# Patient Record
Sex: FEMALE | URBAN - METROPOLITAN AREA
[De-identification: names, ages, dates, MRNs, and addresses within clinical notes are randomized per-mention and may not be internally consistent; named-entity substitution may affect disease eponyms.]

---

## 2020-07-25 ENCOUNTER — APPOINTMENT (RX ONLY)
Dept: URBAN - METROPOLITAN AREA CLINIC 4 | Facility: CLINIC | Age: 35
Setting detail: DERMATOLOGY
End: 2020-07-25

## 2020-07-25 DIAGNOSIS — D22 MELANOCYTIC NEVI: ICD-10-CM

## 2020-07-25 DIAGNOSIS — L81.0 POSTINFLAMMATORY HYPERPIGMENTATION: ICD-10-CM

## 2020-07-25 PROBLEM — D22.112 MELANOCYTIC NEVI OF RIGHT LOWER EYELID, INCLUDING CANTHUS: Status: ACTIVE | Noted: 2020-07-25

## 2020-07-25 PROBLEM — D23.39 OTHER BENIGN NEOPLASM OF SKIN OF OTHER PARTS OF FACE: Status: ACTIVE | Noted: 2020-07-25

## 2020-07-25 PROCEDURE — ? OBSERVATION

## 2020-07-25 PROCEDURE — ? COUNSELING

## 2020-07-25 PROCEDURE — 99203 OFFICE O/P NEW LOW 30 MIN: CPT

## 2020-07-25 ASSESSMENT — LOCATION DETAILED DESCRIPTION DERM
LOCATION DETAILED: RIGHT ANTERIOR MEDIAL PROXIMAL THIGH
LOCATION DETAILED: RIGHT LATERAL INFERIOR PRETARSAL REGION

## 2020-07-25 ASSESSMENT — LOCATION ZONE DERM
LOCATION ZONE: EYELID
LOCATION ZONE: LEG

## 2020-07-25 ASSESSMENT — LOCATION SIMPLE DESCRIPTION DERM
LOCATION SIMPLE: RIGHT LATERAL INFERIOR PRETARSAL REGION
LOCATION SIMPLE: RIGHT THIGH

## 2021-07-29 ENCOUNTER — HOSPITAL ENCOUNTER (INPATIENT)
Dept: HOSPITAL 8 - LDOP | Age: 36
LOS: 3 days | Discharge: HOME | End: 2021-08-01
Attending: OBSTETRICS & GYNECOLOGY | Admitting: OBSTETRICS & GYNECOLOGY
Payer: COMMERCIAL

## 2021-07-29 VITALS — HEIGHT: 66 IN | BODY MASS INDEX: 31.39 KG/M2 | WEIGHT: 195.33 LBS

## 2021-07-29 DIAGNOSIS — Z3A.39: ICD-10-CM

## 2021-07-29 DIAGNOSIS — O09.523: ICD-10-CM

## 2021-07-29 DIAGNOSIS — Z20.822: ICD-10-CM

## 2021-07-29 DIAGNOSIS — O32.2XX0: Primary | ICD-10-CM

## 2021-07-29 LAB
BASOPHILS # BLD AUTO: 0.1 X10^3/UL (ref 0–0.1)
BASOPHILS NFR BLD AUTO: 1 % (ref 0–1)
EOSINOPHIL # BLD AUTO: 0.2 X10^3/UL (ref 0–0.4)
EOSINOPHIL NFR BLD AUTO: 1 % (ref 1–7)
ERYTHROCYTE [DISTWIDTH] IN BLOOD BY AUTOMATED COUNT: 13.4 % (ref 9.6–15.2)
LYMPHOCYTES # BLD AUTO: 2.7 X10^3/UL (ref 1–3.4)
LYMPHOCYTES NFR BLD AUTO: 21 % (ref 22–44)
MCH RBC QN AUTO: 29.9 PG (ref 27–34.8)
MCHC RBC AUTO-ENTMCNC: 34.2 G/DL (ref 32.4–35.8)
MONOCYTES # BLD AUTO: 1.1 X10^3/UL (ref 0.2–0.8)
MONOCYTES NFR BLD AUTO: 8 % (ref 2–9)
NEUTROPHILS # BLD AUTO: 9.3 X10^3/UL (ref 1.8–6.8)
NEUTROPHILS NFR BLD AUTO: 70 % (ref 42–75)
PLATELET # BLD AUTO: 264 X10^3/UL (ref 130–400)
PMV BLD AUTO: 7.5 FL (ref 7.4–10.4)
RBC # BLD AUTO: 4.8 X10^6/UL (ref 3.82–5.3)

## 2021-07-29 PROCEDURE — 36415 COLL VENOUS BLD VENIPUNCTURE: CPT

## 2021-07-29 PROCEDURE — 86592 SYPHILIS TEST NON-TREP QUAL: CPT

## 2021-07-29 PROCEDURE — 87635 SARS-COV-2 COVID-19 AMP PRB: CPT

## 2021-07-29 PROCEDURE — 85025 COMPLETE CBC W/AUTO DIFF WBC: CPT

## 2021-07-29 PROCEDURE — 86850 RBC ANTIBODY SCREEN: CPT

## 2021-07-29 PROCEDURE — 86900 BLOOD TYPING SEROLOGIC ABO: CPT

## 2021-07-30 VITALS — DIASTOLIC BLOOD PRESSURE: 57 MMHG | SYSTOLIC BLOOD PRESSURE: 89 MMHG

## 2021-07-30 VITALS — SYSTOLIC BLOOD PRESSURE: 102 MMHG | DIASTOLIC BLOOD PRESSURE: 62 MMHG

## 2021-07-30 VITALS — DIASTOLIC BLOOD PRESSURE: 57 MMHG | SYSTOLIC BLOOD PRESSURE: 96 MMHG

## 2021-07-30 VITALS — DIASTOLIC BLOOD PRESSURE: 62 MMHG | SYSTOLIC BLOOD PRESSURE: 95 MMHG

## 2021-07-30 VITALS — SYSTOLIC BLOOD PRESSURE: 99 MMHG | DIASTOLIC BLOOD PRESSURE: 64 MMHG

## 2021-07-30 VITALS — DIASTOLIC BLOOD PRESSURE: 59 MMHG | SYSTOLIC BLOOD PRESSURE: 95 MMHG

## 2021-07-30 LAB
BASOPHILS # BLD AUTO: 0 X10^3/UL (ref 0–0.1)
BASOPHILS NFR BLD AUTO: 0 % (ref 0–1)
EOSINOPHIL # BLD AUTO: 0 X10^3/UL (ref 0–0.4)
EOSINOPHIL NFR BLD AUTO: 0 % (ref 1–7)
ERYTHROCYTE [DISTWIDTH] IN BLOOD BY AUTOMATED COUNT: 13.3 % (ref 9.6–15.2)
LYMPHOCYTES # BLD AUTO: 1.2 X10^3/UL (ref 1–3.4)
LYMPHOCYTES NFR BLD AUTO: 7 % (ref 22–44)
MCH RBC QN AUTO: 30 PG (ref 27–34.8)
MCHC RBC AUTO-ENTMCNC: 34.3 G/DL (ref 32.4–35.8)
MONOCYTES # BLD AUTO: 0.8 X10^3/UL (ref 0.2–0.8)
MONOCYTES NFR BLD AUTO: 4 % (ref 2–9)
NEUTROPHILS # BLD AUTO: 15.9 X10^3/UL (ref 1.8–6.8)
NEUTROPHILS NFR BLD AUTO: 89 % (ref 42–75)
PLATELET # BLD AUTO: 227 X10^3/UL (ref 130–400)
PMV BLD AUTO: 7.3 FL (ref 7.4–10.4)
RBC # BLD AUTO: 4.21 X10^6/UL (ref 3.82–5.3)

## 2021-07-30 RX ADMIN — SODIUM CHLORIDE, SODIUM LACTATE, POTASSIUM CHLORIDE, AND CALCIUM CHLORIDE SCH MLS/HR: .6; .31; .03; .02 INJECTION, SOLUTION INTRAVENOUS at 02:00

## 2021-07-30 RX ADMIN — SODIUM CHLORIDE, SODIUM LACTATE, POTASSIUM CHLORIDE, AND CALCIUM CHLORIDE SCH MLS/HR: .6; .31; .03; .02 INJECTION, SOLUTION INTRAVENOUS at 18:00

## 2021-07-30 RX ADMIN — SODIUM CHLORIDE, SODIUM LACTATE, POTASSIUM CHLORIDE, AND CALCIUM CHLORIDE SCH MLS/HR: .6; .31; .03; .02 INJECTION, SOLUTION INTRAVENOUS at 12:00

## 2021-07-30 RX ADMIN — SODIUM CHLORIDE, SODIUM LACTATE, POTASSIUM CHLORIDE, AND CALCIUM CHLORIDE SCH MLS/HR: .6; .31; .03; .02 INJECTION, SOLUTION INTRAVENOUS at 22:00

## 2021-07-30 RX ADMIN — SIMETHICONE PRN MG: 80 TABLET, CHEWABLE ORAL at 09:21

## 2021-07-30 RX ADMIN — DOCUSATE SODIUM PRN MG: 100 CAPSULE, LIQUID FILLED ORAL at 20:59

## 2021-07-30 RX ADMIN — Medication SCH MLS/HR: at 02:00

## 2021-07-30 RX ADMIN — SODIUM CHLORIDE, SODIUM LACTATE, POTASSIUM CHLORIDE, AND CALCIUM CHLORIDE SCH MLS/HR: .6; .31; .03; .02 INJECTION, SOLUTION INTRAVENOUS at 10:00

## 2021-07-30 RX ADMIN — IBUPROFEN PRN MG: 800 TABLET ORAL at 17:03

## 2021-07-30 RX ADMIN — OXYCODONE HYDROCHLORIDE PRN MG: 5 TABLET ORAL at 21:00

## 2021-07-30 RX ADMIN — Medication SCH MLS/HR: at 22:00

## 2021-07-30 RX ADMIN — DOCUSATE SODIUM PRN MG: 100 CAPSULE, LIQUID FILLED ORAL at 09:21

## 2021-07-30 RX ADMIN — Medication SCH MLS/HR: at 12:00

## 2021-07-30 RX ADMIN — IBUPROFEN PRN MG: 800 TABLET ORAL at 09:21

## 2021-07-30 RX ADMIN — PRENATAL VIT W/ FE FUMARATE-FA TAB 27-0.8 MG SCH EACH: 27-0.8 TAB at 09:21

## 2021-07-31 VITALS — DIASTOLIC BLOOD PRESSURE: 64 MMHG | SYSTOLIC BLOOD PRESSURE: 97 MMHG

## 2021-07-31 VITALS — SYSTOLIC BLOOD PRESSURE: 100 MMHG | DIASTOLIC BLOOD PRESSURE: 61 MMHG

## 2021-07-31 VITALS — SYSTOLIC BLOOD PRESSURE: 99 MMHG | DIASTOLIC BLOOD PRESSURE: 58 MMHG

## 2021-07-31 VITALS — SYSTOLIC BLOOD PRESSURE: 94 MMHG | DIASTOLIC BLOOD PRESSURE: 61 MMHG

## 2021-07-31 RX ADMIN — IBUPROFEN PRN MG: 800 TABLET ORAL at 16:49

## 2021-07-31 RX ADMIN — OXYCODONE HYDROCHLORIDE PRN MG: 5 TABLET ORAL at 00:57

## 2021-07-31 RX ADMIN — SIMETHICONE PRN MG: 80 TABLET, CHEWABLE ORAL at 23:02

## 2021-07-31 RX ADMIN — PRENATAL VIT W/ FE FUMARATE-FA TAB 27-0.8 MG SCH EACH: 27-0.8 TAB at 08:41

## 2021-07-31 RX ADMIN — OXYCODONE HYDROCHLORIDE PRN MG: 5 TABLET ORAL at 23:02

## 2021-07-31 RX ADMIN — DOCUSATE SODIUM PRN MG: 100 CAPSULE, LIQUID FILLED ORAL at 08:41

## 2021-07-31 RX ADMIN — OXYCODONE HYDROCHLORIDE PRN MG: 5 TABLET ORAL at 16:51

## 2021-07-31 RX ADMIN — IBUPROFEN PRN MG: 800 TABLET ORAL at 08:41

## 2021-07-31 RX ADMIN — DOCUSATE SODIUM PRN MG: 100 CAPSULE, LIQUID FILLED ORAL at 23:02

## 2021-07-31 RX ADMIN — SODIUM CHLORIDE, SODIUM LACTATE, POTASSIUM CHLORIDE, AND CALCIUM CHLORIDE SCH MLS/HR: .6; .31; .03; .02 INJECTION, SOLUTION INTRAVENOUS at 02:00

## 2021-07-31 RX ADMIN — OXYCODONE HYDROCHLORIDE PRN MG: 5 TABLET ORAL at 08:41

## 2021-07-31 RX ADMIN — IBUPROFEN PRN MG: 800 TABLET ORAL at 00:57

## 2021-08-01 VITALS — DIASTOLIC BLOOD PRESSURE: 62 MMHG | SYSTOLIC BLOOD PRESSURE: 95 MMHG

## 2021-08-01 RX ADMIN — PRENATAL VIT W/ FE FUMARATE-FA TAB 27-0.8 MG SCH EACH: 27-0.8 TAB at 09:44

## 2021-08-01 RX ADMIN — OXYCODONE HYDROCHLORIDE PRN MG: 5 TABLET ORAL at 13:40

## 2021-08-01 RX ADMIN — IBUPROFEN PRN MG: 800 TABLET ORAL at 13:40

## 2021-08-01 RX ADMIN — IBUPROFEN PRN MG: 800 TABLET ORAL at 04:05

## 2023-05-01 ENCOUNTER — OFFICE VISIT (OUTPATIENT)
Dept: INTERNAL MEDICINE | Facility: OTHER | Age: 38
End: 2023-05-01
Payer: COMMERCIAL

## 2023-05-01 VITALS
TEMPERATURE: 98.3 F | WEIGHT: 155.4 LBS | HEART RATE: 77 BPM | SYSTOLIC BLOOD PRESSURE: 94 MMHG | BODY MASS INDEX: 24.98 KG/M2 | DIASTOLIC BLOOD PRESSURE: 62 MMHG | OXYGEN SATURATION: 98 % | HEIGHT: 66 IN

## 2023-05-01 DIAGNOSIS — Z11.59 NEED FOR HEPATITIS C SCREENING TEST: ICD-10-CM

## 2023-05-01 DIAGNOSIS — Z13.228 SCREENING FOR METABOLIC DISORDER: ICD-10-CM

## 2023-05-01 DIAGNOSIS — Z30.09 GENERAL COUNSELING AND ADVICE ON CONTRACEPTIVE MANAGEMENT: ICD-10-CM

## 2023-05-01 DIAGNOSIS — L98.9 SCALP LESION: ICD-10-CM

## 2023-05-01 PROCEDURE — 99204 OFFICE O/P NEW MOD 45 MIN: CPT | Mod: GC | Performed by: INTERNAL MEDICINE

## 2023-05-01 SDOH — HEALTH STABILITY: PHYSICAL HEALTH: ON AVERAGE, HOW MANY DAYS PER WEEK DO YOU ENGAGE IN MODERATE TO STRENUOUS EXERCISE (LIKE A BRISK WALK)?: 3 DAYS

## 2023-05-01 SDOH — ECONOMIC STABILITY: FOOD INSECURITY: WITHIN THE PAST 12 MONTHS, YOU WORRIED THAT YOUR FOOD WOULD RUN OUT BEFORE YOU GOT MONEY TO BUY MORE.: NEVER TRUE

## 2023-05-01 SDOH — ECONOMIC STABILITY: TRANSPORTATION INSECURITY
IN THE PAST 12 MONTHS, HAS LACK OF RELIABLE TRANSPORTATION KEPT YOU FROM MEDICAL APPOINTMENTS, MEETINGS, WORK OR FROM GETTING THINGS NEEDED FOR DAILY LIVING?: NO

## 2023-05-01 SDOH — ECONOMIC STABILITY: TRANSPORTATION INSECURITY
IN THE PAST 12 MONTHS, HAS THE LACK OF TRANSPORTATION KEPT YOU FROM MEDICAL APPOINTMENTS OR FROM GETTING MEDICATIONS?: NO

## 2023-05-01 SDOH — HEALTH STABILITY: MENTAL HEALTH
STRESS IS WHEN SOMEONE FEELS TENSE, NERVOUS, ANXIOUS, OR CAN'T SLEEP AT NIGHT BECAUSE THEIR MIND IS TROUBLED. HOW STRESSED ARE YOU?: TO SOME EXTENT

## 2023-05-01 SDOH — ECONOMIC STABILITY: TRANSPORTATION INSECURITY
IN THE PAST 12 MONTHS, HAS LACK OF TRANSPORTATION KEPT YOU FROM MEETINGS, WORK, OR FROM GETTING THINGS NEEDED FOR DAILY LIVING?: NO

## 2023-05-01 SDOH — HEALTH STABILITY: PHYSICAL HEALTH: ON AVERAGE, HOW MANY MINUTES DO YOU ENGAGE IN EXERCISE AT THIS LEVEL?: 50 MIN

## 2023-05-01 SDOH — ECONOMIC STABILITY: HOUSING INSECURITY
IN THE LAST 12 MONTHS, WAS THERE A TIME WHEN YOU DID NOT HAVE A STEADY PLACE TO SLEEP OR SLEPT IN A SHELTER (INCLUDING NOW)?: NO

## 2023-05-01 SDOH — ECONOMIC STABILITY: FOOD INSECURITY: WITHIN THE PAST 12 MONTHS, THE FOOD YOU BOUGHT JUST DIDN'T LAST AND YOU DIDN'T HAVE MONEY TO GET MORE.: NEVER TRUE

## 2023-05-01 SDOH — ECONOMIC STABILITY: INCOME INSECURITY: IN THE LAST 12 MONTHS, WAS THERE A TIME WHEN YOU WERE NOT ABLE TO PAY THE MORTGAGE OR RENT ON TIME?: NO

## 2023-05-01 SDOH — ECONOMIC STABILITY: HOUSING INSECURITY

## 2023-05-01 SDOH — ECONOMIC STABILITY: INCOME INSECURITY: HOW HARD IS IT FOR YOU TO PAY FOR THE VERY BASICS LIKE FOOD, HOUSING, MEDICAL CARE, AND HEATING?: NOT VERY HARD

## 2023-05-01 ASSESSMENT — SOCIAL DETERMINANTS OF HEALTH (SDOH)
DO YOU BELONG TO ANY CLUBS OR ORGANIZATIONS SUCH AS CHURCH GROUPS UNIONS, FRATERNAL OR ATHLETIC GROUPS, OR SCHOOL GROUPS?: NO
HOW OFTEN DO YOU ATTENT MEETINGS OF THE CLUB OR ORGANIZATION YOU BELONG TO?: NEVER
HOW OFTEN DO YOU HAVE A DRINK CONTAINING ALCOHOL: NEVER
HOW OFTEN DO YOU GET TOGETHER WITH FRIENDS OR RELATIVES?: ONCE A WEEK
HOW OFTEN DO YOU HAVE SIX OR MORE DRINKS ON ONE OCCASION: NEVER
IN A TYPICAL WEEK, HOW MANY TIMES DO YOU TALK ON THE PHONE WITH FAMILY, FRIENDS, OR NEIGHBORS?: ONCE A WEEK
WITHIN THE PAST 12 MONTHS, YOU WORRIED THAT YOUR FOOD WOULD RUN OUT BEFORE YOU GOT THE MONEY TO BUY MORE: NEVER TRUE
HOW OFTEN DO YOU ATTENT MEETINGS OF THE CLUB OR ORGANIZATION YOU BELONG TO?: NEVER
DO YOU BELONG TO ANY CLUBS OR ORGANIZATIONS SUCH AS CHURCH GROUPS UNIONS, FRATERNAL OR ATHLETIC GROUPS, OR SCHOOL GROUPS?: NO
HOW OFTEN DO YOU GET TOGETHER WITH FRIENDS OR RELATIVES?: ONCE A WEEK
HOW MANY DRINKS CONTAINING ALCOHOL DO YOU HAVE ON A TYPICAL DAY WHEN YOU ARE DRINKING: PATIENT DOES NOT DRINK
HOW OFTEN DO YOU ATTEND CHURCH OR RELIGIOUS SERVICES?: NEVER
IN A TYPICAL WEEK, HOW MANY TIMES DO YOU TALK ON THE PHONE WITH FAMILY, FRIENDS, OR NEIGHBORS?: ONCE A WEEK
HOW OFTEN DO YOU ATTEND CHURCH OR RELIGIOUS SERVICES?: NEVER
HOW HARD IS IT FOR YOU TO PAY FOR THE VERY BASICS LIKE FOOD, HOUSING, MEDICAL CARE, AND HEATING?: NOT VERY HARD

## 2023-05-01 ASSESSMENT — ENCOUNTER SYMPTOMS
ABDOMINAL PAIN: 0
COUGH: 0
CONSTIPATION: 0
NERVOUS/ANXIOUS: 1
DIARRHEA: 0
BLURRED VISION: 0
HEADACHES: 0
CHILLS: 0
SHORTNESS OF BREATH: 0
FEVER: 0
DIZZINESS: 0
WEIGHT LOSS: 0
WEAKNESS: 0
PALPITATIONS: 0
DIAPHORESIS: 0
DEPRESSION: 0
NAUSEA: 0
VOMITING: 0
WHEEZING: 0
HEARTBURN: 0
BLOOD IN STOOL: 0
DOUBLE VISION: 0

## 2023-05-01 ASSESSMENT — LIFESTYLE VARIABLES
SKIP TO QUESTIONS 9-10: 1
HOW OFTEN DO YOU HAVE SIX OR MORE DRINKS ON ONE OCCASION: NEVER
AUDIT-C TOTAL SCORE: 0
SUBSTANCE_ABUSE: 0
HOW OFTEN DO YOU HAVE A DRINK CONTAINING ALCOHOL: NEVER
HOW MANY STANDARD DRINKS CONTAINING ALCOHOL DO YOU HAVE ON A TYPICAL DAY: PATIENT DOES NOT DRINK

## 2023-05-01 ASSESSMENT — PATIENT HEALTH QUESTIONNAIRE - PHQ9: CLINICAL INTERPRETATION OF PHQ2 SCORE: 0

## 2023-05-01 NOTE — ASSESSMENT & PLAN NOTE
Patient would not like to have more children. Currently sexually active with  and using condoms. We discussed pros and cons of various methods as well as what we have to offer at our clinic and what we cannot provide (such as IUDs, implants, tubal ligation).     Plan:  -Provided handout on various birth control methods  -Patient will take time to get more education and reach out for prescription/orders or referral to gynecology  -Will continue to use condoms in the interim

## 2023-05-01 NOTE — ASSESSMENT & PLAN NOTE
1x1x0.5 cm hard, nonmobile, nontender round bump on top of scalp (midline) with no visible skin changes, no discharge. First noticed 2017, has not grown or changed. Likely sebacious cyst or lipoma, benign in nature.     Plan:  -Patient will monitor for signs of growth or symptomatic changes  -No imaging needed at this time

## 2023-05-01 NOTE — ASSESSMENT & PLAN NOTE
Patient has not yet been screened for hep C as recommended by USPSTF for all adults.     Plan:  -Hep C screen ordered

## 2023-05-01 NOTE — ASSESSMENT & PLAN NOTE
Patient has significant family history of diabetes and has no recent labs on file screening for metabolic disorder. Given that she is 37 YO she is due for screening.     Plan:  -BMP ordered to evaluate for renal disease, impaired fasting glucose  -lipid panel ordered to evaluate for hyper/dyslipidemia

## 2023-05-01 NOTE — PROGRESS NOTES
"Deirdre Banda is a 38 y.o. female who presents today with the following:    CC: Establish Care with Primary Care Physician and address bump on her head    HPI:  Deirdre Banda is a 37 YO F with no significant contributory PMHx who presents to clinic today to establish care and address a bump on her head that she first noticed in 2017. Back then she was told it was likely a fat collection and there were no concerns. The bump has not changed in size or caused any symptoms other than her feeling maybe there is some link to when she used to get headaches and it would feel like increased pressure in the area; that sensation has resolved. There is no pain associated with the bump and there is no discharge from the lesion.    She did experience a couple days of diarrhea and stomach upset a week ago which she attributes to kids being sick at  and bringing home a bug; symptoms have resolved.     Her last pap smear was in September 2021 and she has had no history of abnormal pap smears or need for biopsies.     She is requesting \"routine\" labs since she has not been seeing a PCP and wants to make sure there is nothing wrong with her labs. Last CBC in 2021 relatively nonconcerning but no recent BMP or lipid panel on file and does have family history of diabetes.     She is using condoms with her  now and is not interested in having more children. She would like to learn more about contraceptive methods.       Review of Systems   Constitutional:  Negative for chills, diaphoresis, fever and weight loss.   HENT:  Negative for hearing loss.    Eyes:  Negative for blurred vision and double vision.   Respiratory:  Negative for cough, shortness of breath and wheezing.    Cardiovascular:  Negative for chest pain and palpitations.   Gastrointestinal:  Negative for abdominal pain, blood in stool, constipation, diarrhea, heartburn, nausea and vomiting.   Genitourinary:  Negative for dysuria.   Neurological:  Negative " "for dizziness, weakness and headaches.   Psychiatric/Behavioral:  Negative for depression, substance abuse and suicidal ideas. The patient is nervous/anxious (about next steps in life).        Past Medical History:   Diagnosis Date    Cysts of left upper eyelid 10/05/2016    Fracture 2016    Reactive airway disease 06/27/2016         Past Surgical History:   Procedure Laterality Date    PRIMARY C SECTION  2021    OTHER  2021    in-office ENT procedure to remove intra-nasal growth    OPEN REDUCTION Left 2016    plate placed left wrist         Family History   Problem Relation Age of Onset    Glaucoma Mother     Hypertension Mother     Diabetes Mother     No Known Problems Father     No Known Problems Brother     Diabetes Maternal Grandmother     Alzheimer's Disease Maternal Grandfather     Stroke Neg Hx     Heart Disease Neg Hx     Cancer Neg Hx          Social History     Tobacco Use    Smoking status: Never    Smokeless tobacco: Never   Vaping Use    Vaping Use: Never used   Substance Use Topics    Alcohol use: Never    Drug use: Never         No current outpatient medications on file.     No current facility-administered medications for this visit.       No results found for this or any previous visit.    BP 94/62 (BP Location: Right arm, Patient Position: Sitting, BP Cuff Size: Adult)   Pulse 77   Temp 36.8 °C (98.3 °F) (Temporal)   Ht 1.676 m (5' 6\")   Wt 70.5 kg (155 lb 6.4 oz)   SpO2 98%   BMI 25.08 kg/m²     Physical Exam  Vitals reviewed.   Constitutional:       General: She is not in acute distress.     Appearance: Normal appearance. She is normal weight. She is not ill-appearing, toxic-appearing or diaphoretic.   HENT:      Head: Atraumatic.      Comments: 1x1 cm hard, well-circumscribed lump on apex of scalp, nontender to palpation     Mouth/Throat:      Mouth: Mucous membranes are moist.   Eyes:      General:         Right eye: No discharge.         Left eye: No discharge.      Extraocular " Movements: Extraocular movements intact.      Pupils: Pupils are equal, round, and reactive to light.   Cardiovascular:      Rate and Rhythm: Normal rate and regular rhythm.      Heart sounds: No murmur heard.  Pulmonary:      Effort: Pulmonary effort is normal.      Breath sounds: Normal breath sounds. No wheezing.   Abdominal:      General: Abdomen is flat. Bowel sounds are normal.      Palpations: Abdomen is soft.      Tenderness: There is no abdominal tenderness.   Musculoskeletal:      Cervical back: Normal range of motion.      Right lower leg: No edema.      Left lower leg: No edema.   Skin:     General: Skin is warm and dry.      Findings: No lesion.   Neurological:      General: No focal deficit present.      Mental Status: She is alert.      Cranial Nerves: No cranial nerve deficit.      Sensory: No sensory deficit.      Motor: No weakness.   Psychiatric:         Mood and Affect: Mood normal.         Behavior: Behavior normal.       Assessment and Plan:     Scalp lesion  1x1x0.5 cm hard, nonmobile, nontender round bump on top of scalp (midline) with no visible skin changes, no discharge. First noticed 2017, has not grown or changed. Likely sebacious cyst or lipoma, benign in nature.     Plan:  -Patient will monitor for signs of growth or symptomatic changes  -No imaging needed at this time    Screening for metabolic disorder  Patient has significant family history of diabetes and has no recent labs on file screening for metabolic disorder. Given that she is 37 YO she is due for screening.     Plan:  -BMP ordered to evaluate for renal disease, impaired fasting glucose  -lipid panel ordered to evaluate for hyper/dyslipidemia     Need for hepatitis C screening test  Patient has not yet been screened for hep C as recommended by USPSTF for all adults.     Plan:  -Hep C screen ordered    General counseling and advice on contraceptive management  Patient would not like to have more children. Currently sexually  active with  and using condoms. We discussed pros and cons of various methods as well as what we have to offer at our clinic and what we cannot provide (such as IUDs, implants, tubal ligation).     Plan:  -Provided handout on various birth control methods  -Patient will take time to get more education and reach out for prescription/orders or referral to gynecology  -Will continue to use condoms in the interim           Orders Placed This Encounter    Lipid Profile    Basic Metabolic Panel    HEP C VIRUS ANTIBODY           Signed by: Kathie Cowart M.D.      Kathie Cowart M.D., PGYI, Internal Medicine  [facility]

## 2023-05-01 NOTE — PATIENT INSTRUCTIONS
Go to lab to get fasting blood work (8 hours with no food but do hydrate with water)   Think about birth control options and if you would like you can message me to order a method or refer you to gynecology

## 2023-05-29 DIAGNOSIS — F51.04 PSYCHOPHYSIOLOGICAL INSOMNIA: ICD-10-CM

## 2023-05-29 DIAGNOSIS — F41.9 ANXIETY DISORDER, UNSPECIFIED TYPE: ICD-10-CM

## 2024-05-15 ENCOUNTER — OFFICE VISIT (OUTPATIENT)
Dept: INTERNAL MEDICINE | Facility: OTHER | Age: 39
End: 2024-05-15
Payer: COMMERCIAL

## 2024-05-15 VITALS
SYSTOLIC BLOOD PRESSURE: 96 MMHG | HEART RATE: 74 BPM | HEIGHT: 66 IN | BODY MASS INDEX: 25.26 KG/M2 | DIASTOLIC BLOOD PRESSURE: 67 MMHG | WEIGHT: 157.2 LBS | TEMPERATURE: 98.2 F | OXYGEN SATURATION: 98 %

## 2024-05-15 DIAGNOSIS — E66.3 OVERWEIGHT (BMI 25.0-29.9): ICD-10-CM

## 2024-05-15 DIAGNOSIS — Z83.42 FAMILY HISTORY OF HIGH CHOLESTEROL: ICD-10-CM

## 2024-05-15 PROCEDURE — 3074F SYST BP LT 130 MM HG: CPT

## 2024-05-15 PROCEDURE — 99213 OFFICE O/P EST LOW 20 MIN: CPT | Mod: GE

## 2024-05-15 PROCEDURE — 3078F DIAST BP <80 MM HG: CPT

## 2024-05-15 ASSESSMENT — ENCOUNTER SYMPTOMS
LOSS OF CONSCIOUSNESS: 0
HEADACHES: 0
ABDOMINAL PAIN: 0
WHEEZING: 0
DIZZINESS: 0
BLOOD IN STOOL: 0
CHILLS: 0
DEPRESSION: 0
NERVOUS/ANXIOUS: 0
CONSTIPATION: 0
NAUSEA: 0
WEIGHT LOSS: 0
COUGH: 0
FALLS: 0
HEARTBURN: 0
DIARRHEA: 0
SHORTNESS OF BREATH: 0
FEVER: 0

## 2024-05-15 ASSESSMENT — PATIENT HEALTH QUESTIONNAIRE - PHQ9: CLINICAL INTERPRETATION OF PHQ2 SCORE: 0

## 2024-05-15 NOTE — ASSESSMENT & PLAN NOTE
BMI 25.37, weight 157 lbs. Patient reports she is trying to eat better. Not interested in nutrition referral.   -Counseled on healthy diet and exercise, focusing on overall health and feeling of wellbeing  -She declined Nutrition referral but will reach out if desires in the future  -Lipid panel, fasting blood glucose to be checked

## 2024-05-15 NOTE — PROGRESS NOTES
Established Patient    Patient Care Team:  Kathie Cowart M.D. as PCP - General (Internal Medicine)    Deirdre Banda is a 39 y.o. female who presents today with the following Chief Complaint(s): Follow up for Diagnoses of Overweight (BMI 25.0-29.9) and Family history of high cholesterol were pertinent to this visit.    HPI:  Deirdre Banda is a 37 YO F with a PMHx anxiety last seen by me a year ago to establish care and order screening labs for metabolic disorder (see media for results 06/2023, lipid panel and BMP WNL). She was referred to behavioral health for difficulty sleeping due to her anxiety (never followed up, does not need now, anxiety mild and related to life stressors, managed with breathing). Today she returns to clinic for general follow up and review her labs. No changes to her health since last visit.     Last pap was July 2021 with Dr. Bridges when she was pregnant. HPV was not cotested for.    Review of Systems   Constitutional:  Negative for chills, fever, malaise/fatigue and weight loss.   Respiratory:  Negative for cough, shortness of breath and wheezing.    Cardiovascular:  Negative for chest pain and leg swelling.   Gastrointestinal:  Negative for abdominal pain, blood in stool, constipation, diarrhea, heartburn and nausea.   Genitourinary:  Negative for hematuria.   Musculoskeletal:  Negative for falls.   Neurological:  Negative for dizziness, loss of consciousness and headaches.   Psychiatric/Behavioral:  Negative for depression. The patient is not nervous/anxious (occ anxiety related to life stressors managed by breathing).        Past Medical History:   Diagnosis Date    Cysts of left upper eyelid 10/05/2016    Fracture 2016    Reactive airway disease 06/27/2016       Social History     Tobacco Use    Smoking status: Never    Smokeless tobacco: Never   Vaping Use    Vaping status: Never Used   Substance Use Topics    Alcohol use: Never    Drug use: Never       No current  "outpatient medications on file.     No current facility-administered medications for this visit.       No results found for this or any previous visit.    BP 96/67 (BP Location: Left arm, Patient Position: Sitting, BP Cuff Size: Adult)   Pulse 74   Temp 36.8 °C (98.2 °F) (Temporal)   Ht 1.676 m (5' 6\")   Wt 71.3 kg (157 lb 3.2 oz)   SpO2 98%   BMI 25.37 kg/m²     Physical Exam  Vitals reviewed.   Constitutional:       General: She is not in acute distress.     Appearance: She is not ill-appearing, toxic-appearing or diaphoretic.   HENT:      Head: Normocephalic and atraumatic.      Mouth/Throat:      Mouth: Mucous membranes are moist.      Pharynx: Oropharynx is clear.   Eyes:      Extraocular Movements: Extraocular movements intact.   Cardiovascular:      Rate and Rhythm: Normal rate and regular rhythm.      Heart sounds: No murmur heard.  Pulmonary:      Effort: Pulmonary effort is normal. No respiratory distress.      Breath sounds: Normal breath sounds. No wheezing or rales.   Abdominal:      General: Abdomen is flat. Bowel sounds are normal. There is no distension.      Palpations: Abdomen is soft.      Tenderness: There is no abdominal tenderness.   Musculoskeletal:      Right lower leg: No edema.      Left lower leg: No edema.   Skin:     General: Skin is warm and dry.   Neurological:      General: No focal deficit present.      Mental Status: She is alert and oriented to person, place, and time.      Gait: Gait normal.   Psychiatric:         Mood and Affect: Mood normal.         Behavior: Behavior normal.           Assessment and Plan:     Overweight (BMI 25.0-29.9)  BMI 25.37, weight 157 lbs. Patient reports she is trying to eat better. Not interested in nutrition referral.   -Counseled on healthy diet and exercise, focusing on overall health and feeling of wellbeing  -She declined Nutrition referral but will reach out if desires in the future  -Lipid panel, fasting blood glucose to be checked "     Family history of high cholesterol  Per patient report. Last lipid panel 06/2023 with LDL 88, HDL 57, . ASCVD risk score 0.2%  -Updated lipid panel due to patient concern of family history        Orders Placed This Encounter    CBC WITH DIFFERENTIAL    Basic Metabolic Panel    Lipid Profile     Patient will return for pap smear       Kathie Cowart M.D. PGY II  [facility] [employer]

## 2024-05-15 NOTE — ASSESSMENT & PLAN NOTE
Per patient report. Last lipid panel 06/2023 with LDL 88, HDL 57, . ASCVD risk score 0.2%  -Updated lipid panel due to patient concern of family history

## 2024-05-15 NOTE — PATIENT INSTRUCTIONS
Go to the lab to get fasting blood work (8 hrs no food, but hydrate with plain water)  If you want to see our nutritionists let me know

## 2024-10-24 ENCOUNTER — OFFICE VISIT (OUTPATIENT)
Dept: URGENT CARE | Facility: CLINIC | Age: 39
End: 2024-10-24
Payer: COMMERCIAL

## 2024-10-24 VITALS
HEIGHT: 66 IN | OXYGEN SATURATION: 100 % | WEIGHT: 155.6 LBS | DIASTOLIC BLOOD PRESSURE: 60 MMHG | SYSTOLIC BLOOD PRESSURE: 102 MMHG | BODY MASS INDEX: 25.01 KG/M2 | HEART RATE: 69 BPM | TEMPERATURE: 97.9 F | RESPIRATION RATE: 12 BRPM

## 2024-10-24 DIAGNOSIS — B02.9 HERPES ZOSTER WITHOUT COMPLICATION: ICD-10-CM

## 2024-10-24 PROCEDURE — 3078F DIAST BP <80 MM HG: CPT | Performed by: STUDENT IN AN ORGANIZED HEALTH CARE EDUCATION/TRAINING PROGRAM

## 2024-10-24 PROCEDURE — 3074F SYST BP LT 130 MM HG: CPT | Performed by: STUDENT IN AN ORGANIZED HEALTH CARE EDUCATION/TRAINING PROGRAM

## 2024-10-24 PROCEDURE — 99203 OFFICE O/P NEW LOW 30 MIN: CPT | Performed by: STUDENT IN AN ORGANIZED HEALTH CARE EDUCATION/TRAINING PROGRAM

## 2024-10-24 RX ORDER — MUPIROCIN 20 MG/G
1 OINTMENT TOPICAL 2 TIMES DAILY
Qty: 15 G | Refills: 0 | Status: SHIPPED | OUTPATIENT
Start: 2024-10-24 | End: 2024-11-03

## 2024-10-24 RX ORDER — VALACYCLOVIR HYDROCHLORIDE 1 G/1
1000 TABLET, FILM COATED ORAL 3 TIMES DAILY
Qty: 21 TABLET | Refills: 0 | Status: SHIPPED | OUTPATIENT
Start: 2024-10-24 | End: 2024-10-31

## 2025-01-24 ENCOUNTER — APPOINTMENT (OUTPATIENT)
Dept: INTERNAL MEDICINE | Facility: OTHER | Age: 40
End: 2025-01-24
Payer: COMMERCIAL

## 2025-02-26 ENCOUNTER — APPOINTMENT (OUTPATIENT)
Dept: INTERNAL MEDICINE | Facility: OTHER | Age: 40
End: 2025-02-26
Payer: COMMERCIAL

## 2025-03-26 ENCOUNTER — HOSPITAL ENCOUNTER (OUTPATIENT)
Dept: RADIOLOGY | Facility: MEDICAL CENTER | Age: 40
End: 2025-03-26
Payer: COMMERCIAL

## 2025-03-26 DIAGNOSIS — Z12.31 VISIT FOR SCREENING MAMMOGRAM: ICD-10-CM

## 2025-03-26 PROCEDURE — 77067 SCR MAMMO BI INCL CAD: CPT

## 2025-04-08 ENCOUNTER — APPOINTMENT (OUTPATIENT)
Dept: INTERNAL MEDICINE | Facility: OTHER | Age: 40
End: 2025-04-08
Payer: COMMERCIAL

## 2025-04-08 VITALS
HEART RATE: 71 BPM | OXYGEN SATURATION: 97 % | TEMPERATURE: 98.7 F | HEIGHT: 66 IN | BODY MASS INDEX: 24.36 KG/M2 | SYSTOLIC BLOOD PRESSURE: 86 MMHG | WEIGHT: 151.6 LBS | DIASTOLIC BLOOD PRESSURE: 60 MMHG

## 2025-04-08 DIAGNOSIS — Z71.85 VACCINE COUNSELING: ICD-10-CM

## 2025-04-08 DIAGNOSIS — R03.1 LOW BLOOD PRESSURE READING: ICD-10-CM

## 2025-04-08 PROBLEM — E66.3 OVERWEIGHT (BMI 25.0-29.9): Status: RESOLVED | Noted: 2024-05-15 | Resolved: 2025-04-08

## 2025-04-08 PROCEDURE — 3074F SYST BP LT 130 MM HG: CPT | Mod: GE

## 2025-04-08 PROCEDURE — 99213 OFFICE O/P EST LOW 20 MIN: CPT | Mod: GE

## 2025-04-08 PROCEDURE — 3078F DIAST BP <80 MM HG: CPT | Mod: GE

## 2025-04-08 ASSESSMENT — PATIENT HEALTH QUESTIONNAIRE - PHQ9: CLINICAL INTERPRETATION OF PHQ2 SCORE: 0

## 2025-04-08 ASSESSMENT — ENCOUNTER SYMPTOMS
NERVOUS/ANXIOUS: 0
DIZZINESS: 0
BLURRED VISION: 0
SHORTNESS OF BREATH: 0
WEIGHT LOSS: 1
DEPRESSION: 0
FALLS: 0
BLOOD IN STOOL: 0
LOSS OF CONSCIOUSNESS: 0
FEVER: 0

## 2025-04-08 ASSESSMENT — LIFESTYLE VARIABLES: SUBSTANCE_ABUSE: 0

## 2025-04-08 NOTE — ASSESSMENT & PLAN NOTE
Recommending obtaining shingles vaccine given her personal history of shingles (obtained from infection from family member) and covid booster from pharmacy

## 2025-04-08 NOTE — ASSESSMENT & PLAN NOTE
History of soft blood pressure, asymptomatic. Blood pressure today 86/60. Not well hydrated this morning however again asymptomatic, remainder of vitals stable, exam unremarkable. Likely her physiologic set point     -No need to workup further given completely asymptomatic, no associated red flags.   -counseled patient on symptom identification and action plan if she becomes symptomatic

## 2025-04-08 NOTE — PROGRESS NOTES
Established Patient    Patient Care Team:  Kathie Cowart M.D. as PCP - General (Internal Medicine)    Deirdre Banda is a 40 y.o. female who presents today with the following Chief Complaint(s): Follow up for Diagnoses of Low blood pressure reading and Vaccine counseling were pertinent to this visit.    HPI:  Deirdre Banda is a 39 YO F with a PMHx overweight, anxiety, family history of high cholesterol last seen by me in May 2024 at which time we discussed her weight (BMI 25.37, 157 lbs). She declined nutrition referral and lipid panel and fasting glucose levels were ordered. She was to return for pap smear but did not. Since then she had an urgent care visit for shingles after exposure to her mother with shingles. She was prescribed valacyclovir and mupirocin. Mammogram was done last month for screening with no evidence of malignancy. March lipid panel showed HDL low at 49 but otherwise normal (ASCVD risk score <1%), fasting glucose WNL 76, chemistry and CBC otherwise WNL.     She started exercising a few weeks ago. She is trying to eat heathfully as well. Of note she has been tired because has been staying up until 11 pm due to taking classes right now and then waking up early to get her kids ready.     -Vaccines: needs varicella vaccine and covid booster  -pap: would like to schedule    Of note her blood pressure tends to run on the lower end. She is asymptomatic with this. No falls, no passing out, no chest pain, no shortness of breath. She stays hydrated, though did not drink much this morning.     Review of Systems   Constitutional:  Positive for weight loss (intentional). Negative for fever.   HENT:  Negative for hearing loss.    Eyes:  Negative for blurred vision.   Respiratory:  Negative for shortness of breath.    Cardiovascular:  Negative for chest pain.   Gastrointestinal:  Negative for blood in stool.   Musculoskeletal:  Negative for falls.   Neurological:  Negative for dizziness and loss of  "consciousness.   Psychiatric/Behavioral:  Negative for depression and substance abuse. The patient is not nervous/anxious.        Past Medical History:   Diagnosis Date    Cysts of left upper eyelid 10/05/2016    Fracture 2016    Overweight (BMI 25.0-29.9) 05/15/2024    Reactive airway disease 06/27/2016       Social History     Tobacco Use    Smoking status: Never    Smokeless tobacco: Never   Vaping Use    Vaping status: Never Used   Substance Use Topics    Alcohol use: Never    Drug use: Never       No current outpatient medications on file.     No current facility-administered medications for this visit.       No results found for this or any previous visit.    BP (!) 86/60 (BP Location: Right arm, Patient Position: Sitting, BP Cuff Size: Adult)   Pulse 71   Temp 37.1 °C (98.7 °F) (Temporal)   Ht 1.676 m (5' 6\")   Wt 68.8 kg (151 lb 9.6 oz)   SpO2 97%   BMI 24.47 kg/m²     Physical Exam  Vitals reviewed.   Constitutional:       General: She is not in acute distress.     Appearance: She is not ill-appearing, toxic-appearing or diaphoretic.   HENT:      Head: Normocephalic and atraumatic.   Eyes:      Extraocular Movements: Extraocular movements intact.   Cardiovascular:      Rate and Rhythm: Normal rate.      Heart sounds: No murmur heard.  Pulmonary:      Effort: Pulmonary effort is normal. No respiratory distress.      Breath sounds: Normal breath sounds. No wheezing.   Abdominal:      General: Abdomen is flat.      Palpations: Abdomen is soft.   Musculoskeletal:      Right lower leg: No edema.      Left lower leg: No edema.   Skin:     General: Skin is warm and dry.   Neurological:      General: No focal deficit present.      Mental Status: She is alert and oriented to person, place, and time.      Gait: Gait normal.   Psychiatric:         Mood and Affect: Mood normal.         Behavior: Behavior normal.           Assessment and Plan:     Low blood pressure reading  History of soft blood pressure, " asymptomatic. Blood pressure today 86/60. Not well hydrated this morning however again asymptomatic, remainder of vitals stable, exam unremarkable. Likely her physiologic set point     -No need to workup further given completely asymptomatic, no associated red flags.   -counseled patient on symptom identification and action plan if she becomes symptomatic     Vaccine counseling  Recommending obtaining shingles vaccine given her personal history of shingles (obtained from infection from family member) and covid booster from pharmacy        No orders of the defined types were placed in this encounter.    Will perform pap smear next month     Case discussed with Dr. Joseph Cowart M.D. PGY III  [facility]

## 2025-05-20 ENCOUNTER — OFFICE VISIT (OUTPATIENT)
Dept: INTERNAL MEDICINE | Facility: OTHER | Age: 40
End: 2025-05-20
Payer: COMMERCIAL

## 2025-05-20 VITALS
BODY MASS INDEX: 24.68 KG/M2 | SYSTOLIC BLOOD PRESSURE: 88 MMHG | HEIGHT: 66 IN | WEIGHT: 153.6 LBS | HEART RATE: 76 BPM | TEMPERATURE: 98.5 F | OXYGEN SATURATION: 97 % | DIASTOLIC BLOOD PRESSURE: 50 MMHG

## 2025-05-20 DIAGNOSIS — N92.0 MENORRHAGIA WITH REGULAR CYCLE: Primary | ICD-10-CM

## 2025-05-20 DIAGNOSIS — Z12.4 CERVICAL CANCER SCREENING: ICD-10-CM

## 2025-05-20 DIAGNOSIS — R03.1 LOW BLOOD PRESSURE READING: ICD-10-CM

## 2025-05-20 DIAGNOSIS — N88.8 CERVICAL DISCHARGE: ICD-10-CM

## 2025-05-20 PROCEDURE — 99000 SPECIMEN HANDLING OFFICE-LAB: CPT | Mod: GC

## 2025-05-20 PROCEDURE — 3078F DIAST BP <80 MM HG: CPT | Mod: GC

## 2025-05-20 PROCEDURE — 3074F SYST BP LT 130 MM HG: CPT | Mod: GC

## 2025-05-20 PROCEDURE — 99396 PREV VISIT EST AGE 40-64: CPT | Mod: GC

## 2025-05-20 ASSESSMENT — ENCOUNTER SYMPTOMS
FALLS: 0
DIARRHEA: 0
CONSTIPATION: 0
ABDOMINAL PAIN: 0
LOSS OF CONSCIOUSNESS: 0

## 2025-05-20 NOTE — ASSESSMENT & PLAN NOTE
Reports heavy menstrual bleeding ever since birth of her second child in 2021.  No associated anemia on March 2025 labs.  Not on blood thinners, aspirin, no copper IUD.    - Transvaginal ultrasound ordered to evaluate for endometrial hyperplasia, fibroids, adenomyosis  - CBC ordered to evaluate for anemia development

## 2025-05-20 NOTE — ASSESSMENT & PLAN NOTE
Chronic issue, largely asymptomatic with normal exam.  Patient does not consume much salt. March 2025 chemistry panel and CBC without abnormalities.     -Check a.m. cortisol, repeat CBC to ensure no anemia due to heavy menstrual bleeding, check TSH  - Encouraged to ensure at least 2 L of water intake daily and increase salt intake

## 2025-05-20 NOTE — PROGRESS NOTES
Established Patient    Patient Care Team:  Kathie Cowart M.D. as PCP - General (Internal Medicine)    Deirdre Banda is a 40 y.o. female who presents today with the following Chief Complaint(s): Follow up for The primary encounter diagnosis was Menorrhagia with regular cycle. Diagnoses of Cervical cancer screening, Cervical discharge, and Low blood pressure reading were also pertinent to this visit.    HPI:  Menstrual history:   -menarche: ~12 y.o?  -regularity: yes  -duration: 3-4 days   -flow pattern: heavy after giving birth (last ). No anemia in 2025 (see media)  -intermenstrual bleeding: none     Vaginal symptoms:  -pain: none  -abnormal discharge: none     STI screens:  -HIV: done prenatally, will get records  -HCV: done prenatally, will get records  -RPR: done prenatally, will get records    Pap history:  -last pap: ?   -history of abnormal pap: no     Breast cancer screening:  -Last mammogram: 2025, no evidence of malignancy  -Family history breast/colon/pancreatic/ovarian cancer: no    Obstetric history:  -  -Vaginal delivery x1, C section x1    HPV vaccine:  -not received, can get at pharmacy, monogamous with      Sexual activity:  -monogamous with      Relationship safety:  -yes       Of note her blood pressure is always on the lower end/low. She has always been asymptomatic with this. Does not consume much salt. Electrolytes and CBC both WNL in 2025. BP 88/50 today.     Review of Systems   Gastrointestinal:  Negative for abdominal pain, constipation and diarrhea.   Musculoskeletal:  Negative for falls.   Neurological:  Negative for loss of consciousness.       Past Medical History[1]    Social History[2]    Current Medications[3]    No results found for this or any previous visit.    BP (!) 88/50 (BP Location: Left arm, Patient Position: Sitting, BP Cuff Size: Adult) Comment: c/o tired and has not eaten  Pulse 76   Temp 36.9 °C (98.5 °F) (Temporal)    "Ht 1.676 m (5' 6\")   Wt 69.7 kg (153 lb 9.6 oz)   SpO2 97%   BMI 24.79 kg/m²     Physical Exam  Vitals reviewed. Exam conducted with a chaperone present (Tootie Nowak MA).   Constitutional:       General: She is not in acute distress.     Appearance: Normal appearance. She is not ill-appearing, toxic-appearing or diaphoretic.   HENT:      Head: Normocephalic and atraumatic.   Cardiovascular:      Rate and Rhythm: Normal rate.   Pulmonary:      Effort: Pulmonary effort is normal.   Genitourinary:     Exam position: Lithotomy position.      Labia:         Right: No rash, tenderness, lesion or injury.         Left: No rash, tenderness, lesion or injury.       Urethra: Prolapse present.      Vagina: Normal. No signs of injury. No vaginal discharge, tenderness or lesions.      Cervix: Discharge (yellow, thick) and friability present. No cervical motion tenderness, lesion, erythema or eversion.   Neurological:      Mental Status: She is alert.   Psychiatric:         Mood and Affect: Mood normal.         Behavior: Behavior normal.         Thought Content: Thought content normal.           Assessment and Plan:     Cervical cancer screening  Here for screening Pap, no history of abnormal Pap smears.  Speculum exam reveals friable cervical os upon brush insertion.  No masses, other lesions.    - Pap with HPV swab collected    Cervical discharge  Thick yellow cervical os discharge noted with associated cervical os friability but no cervical motion tenderness.  In monogamous sexual relationship with her  for the past 12 years.  Low likelihood for STI though would like to rule out    - Swab sent for BV, trichomonas, chlamydia, gonorrhea    Menorrhagia with regular cycle  Reports heavy menstrual bleeding ever since birth of her second child in 2021.  No associated anemia on March 2025 labs.  Not on blood thinners, aspirin, no copper IUD.    - Transvaginal ultrasound ordered to evaluate for endometrial hyperplasia, " fibroids, adenomyosis  - CBC ordered to evaluate for anemia development    Low blood pressure reading  Chronic issue, largely asymptomatic with normal exam.  Patient does not consume much salt. March 2025 chemistry panel and CBC without abnormalities.     -Check a.m. cortisol, repeat CBC to ensure no anemia due to heavy menstrual bleeding, check TSH  - Encouraged to ensure at least 2 L of water intake daily and increase salt intake       Orders Placed This Encounter    US-PELVIC TRANSVAGINAL ONLY    THINPREP PAP WITH HPV    VAGINAL PATHOGENS DNA PANEL    Chlamydia/GC, PCR (Genital/Anal swab)    CBC WITHOUT DIFFERENTIAL    TSH    CORTISOL - AM         Case discussed with Dr. Scott Cowart M.D. PGY III  [facility] [employer]           [1]   Past Medical History:  Diagnosis Date    Cysts of left upper eyelid 10/05/2016    Fracture 2016    Overweight (BMI 25.0-29.9) 05/15/2024    Reactive airway disease 06/27/2016   [2]   Social History  Tobacco Use    Smoking status: Never    Smokeless tobacco: Never   Vaping Use    Vaping status: Never Used   Substance Use Topics    Alcohol use: Never    Drug use: Never   [3]   No current outpatient medications on file.     No current facility-administered medications for this visit.

## 2025-05-20 NOTE — ASSESSMENT & PLAN NOTE
Here for screening Pap, no history of abnormal Pap smears.  Speculum exam reveals friable cervical os upon brush insertion.  No masses, other lesions.    - Pap with HPV swab collected

## 2025-05-20 NOTE — ASSESSMENT & PLAN NOTE
Thick yellow cervical os discharge noted with associated cervical os friability but no cervical motion tenderness.  In monogamous sexual relationship with her  for the past 12 years.  Low likelihood for STI though would like to rule out    - Swab sent for BV, trichomonas, chlamydia, gonorrhea

## 2025-05-20 NOTE — PATIENT INSTRUCTIONS
Go to the lab at 7:30 am to get blood work done (not fasting)  Ensure you drink at least 2 liters of water per day and feel free to consume more salt in your diet  Get your ultrasound scheduled   I graduate in June. If you would like to remain with me as your PCP, I would love to continue seeing you, however I will not be back in clinic until September, so please call [phone] to schedule to re-establish with me around that time. In the meantime if you need to be seen prior to that, feel free to see a resident but let them know you would like to remain with me.

## 2025-05-27 DIAGNOSIS — B96.89 BACTERIAL VAGINOSIS: Primary | ICD-10-CM

## 2025-05-27 DIAGNOSIS — N76.0 BACTERIAL VAGINOSIS: Primary | ICD-10-CM

## 2025-05-27 DIAGNOSIS — Z12.4 CERVICAL CANCER SCREENING: ICD-10-CM

## 2025-05-27 DIAGNOSIS — N88.8 CERVICAL DISCHARGE: ICD-10-CM

## 2025-05-27 RX ORDER — METRONIDAZOLE 500 MG/1
500 TABLET ORAL 2 TIMES DAILY
Qty: 14 TABLET | Refills: 0 | Status: SHIPPED | OUTPATIENT
Start: 2025-05-27 | End: 2025-06-03

## 2025-08-21 DIAGNOSIS — N92.0 MENORRHAGIA WITH REGULAR CYCLE: Primary | ICD-10-CM
